# Patient Record
Sex: MALE | Race: WHITE | NOT HISPANIC OR LATINO | Employment: FULL TIME | URBAN - METROPOLITAN AREA
[De-identification: names, ages, dates, MRNs, and addresses within clinical notes are randomized per-mention and may not be internally consistent; named-entity substitution may affect disease eponyms.]

---

## 2018-08-21 ENCOUNTER — OFFICE VISIT (OUTPATIENT)
Dept: OBGYN CLINIC | Facility: MEDICAL CENTER | Age: 43
End: 2018-08-21
Payer: OTHER MISCELLANEOUS

## 2018-08-21 VITALS
HEART RATE: 78 BPM | SYSTOLIC BLOOD PRESSURE: 130 MMHG | BODY MASS INDEX: 35 KG/M2 | DIASTOLIC BLOOD PRESSURE: 89 MMHG | HEIGHT: 71 IN | WEIGHT: 250 LBS

## 2018-08-21 DIAGNOSIS — M77.11 LATERAL EPICONDYLITIS OF RIGHT ELBOW: Primary | ICD-10-CM

## 2018-08-21 PROCEDURE — 99204 OFFICE O/P NEW MOD 45 MIN: CPT | Performed by: FAMILY MEDICINE

## 2018-08-21 PROCEDURE — 20551 NJX 1 TENDON ORIGIN/INSJ: CPT | Performed by: FAMILY MEDICINE

## 2018-08-21 RX ORDER — FLUTICASONE PROPIONATE 50 MCG
SPRAY, SUSPENSION (ML) NASAL
COMMUNITY
Start: 2016-02-23

## 2018-08-21 RX ORDER — TOPIRAMATE 50 MG/1
50 TABLET, FILM COATED ORAL
COMMUNITY
Start: 2018-04-24

## 2018-08-21 RX ORDER — METHYLPREDNISOLONE ACETATE 40 MG/ML
1 INJECTION, SUSPENSION INTRA-ARTICULAR; INTRALESIONAL; INTRAMUSCULAR; SOFT TISSUE
Status: DISCONTINUED | OUTPATIENT
Start: 2018-08-21 | End: 2018-08-21

## 2018-08-21 RX ORDER — BUTALBITAL/ASPIRIN/CAFFEINE 50-325-40
1 CAPSULE ORAL EVERY 4 HOURS PRN
COMMUNITY

## 2018-08-21 RX ORDER — IBUPROFEN 600 MG/1
TABLET ORAL AS NEEDED
COMMUNITY
Start: 2018-07-30

## 2018-08-21 RX ORDER — TRAMADOL HYDROCHLORIDE 50 MG/1
50 TABLET ORAL EVERY 6 HOURS PRN
COMMUNITY
End: 2018-09-05 | Stop reason: ALTCHOICE

## 2018-08-21 RX ORDER — BUPIVACAINE HYDROCHLORIDE 2.5 MG/ML
0.5 INJECTION, SOLUTION INFILTRATION; PERINEURAL
Status: COMPLETED | OUTPATIENT
Start: 2018-08-21 | End: 2018-08-21

## 2018-08-21 RX ORDER — ESCITALOPRAM OXALATE 20 MG/1
20 TABLET ORAL
COMMUNITY
Start: 2018-04-24

## 2018-08-21 RX ADMIN — BUPIVACAINE HYDROCHLORIDE 0.5 ML: 2.5 INJECTION, SOLUTION INFILTRATION; PERINEURAL at 11:54

## 2018-08-21 NOTE — LETTER
August 21, 2018     Patient: Levine Render   YOB: 1975   Date of Visit: 8/21/2018       To Whom it May Concern:    Justine Gutierrez is under my professional care  He was seen in my office on 8/21/2018  He may return to work on 08/23/2018  I recommend light duty work restrictions to include no pulling pushing lifting over 10 lb right upper extremity  If you have any questions or concerns, please don't hesitate to call           Sincerely,          Brenda House III, DO        CC: Levine Render

## 2018-08-21 NOTE — PROGRESS NOTES
1  Lateral epicondylitis of right elbow  [facility]     Orders Placed This Encounter   Procedures    Hand/upper extremity injection    [facility]        Imaging Studies (I personally reviewed images in PACS and report):  X-ray report from urgent care reviewed:  No acute fracture  intact joint    IMPRESSION:  Right lateral tennis elbow  Gunderson    Interventions:  Corticosteroid injection lateral epicondyle right-08/21/2018  Physical therapy ordered right tennis elbow-08/21/2018  Light duty- 8/21/18    Return in about 2 weeks (around 9/4/2018)  For reassessment of return to full duty      Patient Instructions   Tennis elbow (lateral epicondylitis) or its cousin, Golfers elbow (medial epicondyltitis), are irritations and inflammations of the anchor points of your forearm muscles at your elbow  Tennis elbow is usually caused by overuse, extending your wrist (pushing wrist up) and golfers elbow by flexion of the wrist (pushing wrist down)  This takes at least 6 weeks to heal and usually up to 3 months to see complete resolution  Injections help to reduce pain significantly but the pain will return if you do not perform physical therapy  The curative treatment is physical therapy  Injections, tennis elbow straps, and anti-inflammatories only help to reduce pain  This is a clinical diagnosis and may require xray but MRI is usually not considered until at least 3 months of failed treatment  Surgery is generally reserved for cases which do not improve after 6 months of conservative treatment  (FRANCO Modi 2017)  reviewed red flags with patient regarding injection including infection, skin dimpling, hypo-pigmentation, tendon rupture, and nerve damage  patient expressed understanding and agreed to proceed with procedure  educated if any symptoms including fevers, chills, swelling, or worsening symptoms occur then to call office or go to hospital for immediate care if physician unavailable  patient expressed understanding and agreed to plan  CHIEF COMPLAINT:  Right elbow pain    HPI:  Marco Aquino is a 43 y o  male  who presents for       Visit  08/21/2018:   Evaluation right elbow pain has been ongoing for 3 weeks starting July 30, 2018  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 began soon after doing a whole with a shovel  Patient did present to Urgent Care for evaluation where had x-ray performed and he was told x-ray was normal   He was explained that he may have tennis elbow referred to Orthopedics for confirmation and evaluation  Today, patient points to lateral epicondyle as greatest source of pain  He states his worse with using his wrist for motion  He does have constant mild pain significantly worsened to moderate intensity sharp throbbing exacerbated by wrist extension  Review of Systems   Constitutional: Negative for chills, fever and unexpected weight change  HENT: Negative for hearing loss, nosebleeds and sore throat  Eyes: Negative for pain, redness and visual disturbance  Respiratory: Negative for cough, shortness of breath and wheezing  Cardiovascular: Negative for chest pain, palpitations and leg swelling  Gastrointestinal: Negative for abdominal distention, nausea and vomiting  Endocrine: Negative for polydipsia and polyuria  Genitourinary: Negative for dysuria and hematuria  Skin: Negative for rash and wound  Neurological: Negative for dizziness, numbness and headaches  Psychiatric/Behavioral: Negative for decreased concentration and suicidal ideas  Following history reviewed and update:    History reviewed  No pertinent past medical history  History reviewed  No pertinent surgical history    Social History   History   Alcohol Use No     History   Drug Use No     History   Smoking Status    Former Smoker    Types: Cigarettes   Smokeless Tobacco    Never Used     Comment: quit 2 yrs ago Family History   Problem Relation Age of Onset    Diabetes Mother     No Known Problems Father      No Known Allergies       Physical Exam  Constitutional:  see vital signs  Gen: well-developed, normocephalic/atraumatic, well-groomed  Eyes: No inflammation or discharge of conjunctiva or lids; sclera clear   Pharynx: no inflammation, lesion, or mass of lips  Neck: supple, no masses, non-distended  MSK: no inflammation, lesion, mass, or clubbing of nails and digits except for other than mentioned below  SKIN: no visible rashes or skin lesions  Pulmonary/Chest: Effort normal  No respiratory distress  NEURO: cranial nerves grossly intact  PSYCH:  Alert and oriented to person, place, and time; recent and remote memory intact; mood normal, no depression, anxiety, or agitation, judgment and insight good and intact     Right Elbow Exam     Tenderness   The patient is experiencing tenderness in the lateral epicondyle  Range of Motion   The patient has normal right elbow ROM  Muscle Strength   The patient has normal right elbow strength      Other   Erythema: absent  Scars: absent  Sensation: normal    Comments:  Chief complaint of pain reproduced at lateral epicondyle with resisted isometric contraction of wrist and finger extensors          Right Wrist  no swelling, erythema, or increased warmth  rom full  nontender  no laxity of joint; druj stable    Right Hand  no erythema  swelling:  None  tenderness:  None  rom fingers mcp, pip, dip intact without pain  No digital scissoring or deviation of fingers  no extensor lag  no rotation of fingers  no joint laxity  strenght flexion and extension mcp, pip, dip 5/5  sensation intact  capillary refill intact        Hand/upper extremity injection  Date/Time: 8/21/2018 11:54 AM  Consent given by: patient  Site marked: site marked  Timeout: Immediately prior to procedure a time out was called to verify the correct patient, procedure, equipment, support staff and site/side marked as required   Supporting Documentation  Indications: diagnostic, pain and therapeutic   Procedure Details  Condition:lateral epicondylitis Preparation: Patient was prepped and draped in the usual sterile fashion  Needle size: 22 G  Ultrasound guidance: no  Approach: medial  Medications administered: 0 5 mL bupivacaine 0 25 % (0 5ml  depo-medrol injected)  Patient tolerance: patient tolerated the procedure well with no immediate complications  Dressing:  Sterile dressing applied      Patient improvement of symptoms on repeat testing after injection

## 2018-08-21 NOTE — PATIENT INSTRUCTIONS
Tennis elbow (lateral epicondylitis) or its cousin, Golfers elbow (medial epicondyltitis), are irritations and inflammations of the anchor points of your forearm muscles at your elbow  Tennis elbow is usually caused by overuse, extending your wrist (pushing wrist up) and golfers elbow by flexion of the wrist (pushing wrist down)  This takes at least 6 weeks to heal and usually up to 3 months to see complete resolution  Injections help to reduce pain significantly but the pain will return if you do not perform physical therapy  The curative treatment is physical therapy  Injections, tennis elbow straps, and anti-inflammatories only help to reduce pain  This is a clinical diagnosis and may require xray but MRI is usually not considered until at least 3 months of failed treatment  Surgery is generally reserved for cases which do not improve after 6 months of conservative treatment  (FRANCO Modi 2017)

## 2018-08-27 ENCOUNTER — EVALUATION (OUTPATIENT)
Dept: PHYSICAL THERAPY | Facility: CLINIC | Age: 43
End: 2018-08-27
Payer: OTHER MISCELLANEOUS

## 2018-08-27 DIAGNOSIS — M77.11 EPICONDYLITIS, LATERAL, RIGHT: Primary | ICD-10-CM

## 2018-08-27 PROCEDURE — 97010 HOT OR COLD PACKS THERAPY: CPT

## 2018-08-27 PROCEDURE — 97162 PT EVAL MOD COMPLEX 30 MIN: CPT

## 2018-08-27 PROCEDURE — G8984 CARRY CURRENT STATUS: HCPCS

## 2018-08-27 PROCEDURE — G8985 CARRY GOAL STATUS: HCPCS

## 2018-08-27 PROCEDURE — 97110 THERAPEUTIC EXERCISES: CPT

## 2018-08-27 NOTE — PROGRESS NOTES
PT Evaluation     Today's date: 2018  Patient name: Isai Pyle  : 1975  MRN: [id number]  Referring provider: Chio Britt  Dx:   Encounter Diagnosis     ICD-10-CM    1  Epicondylitis, lateral, right M77 11                 Assessment  Impairments: abnormal or restricted ROM, impaired physical strength, lacks appropriate home exercise program and pain with function    Assessment details: Patient is a pleasant 43year old male who was referred to Out-Patient physical therapy s/p work related injury where he developed Lateral Epicondylitis of the Right Elbow on 2018  He underwent 0 5 mL bupivacaine 0 25 % (0 5ml  depo-medrol injected) on 2018  Patient reports some improvement in symptoms  At this time patient presents with Right elbow swelling, pain with palpation and  functional movements and decrease ROM and Muscle strength  Patient will benefit from skilled Pt intervention to address findings and facilitate optimal functional status  Understanding of Dx/Px/POC: excellent  Goals  STG:  Decrease pain at worse 4/10  Increased Right elbow supination/pronation AROM WNL  Increase MS 4/5  Decreased edema by 1 cm girth  Independent in HEP  LTG:  Resolve pain  Resolve edema  Improve MS 5/5  Improve AROM WNL  Return to full time/full duty work    Plan  Planned modality interventions: cryotherapy and unattended electrical stimulation  Planned therapy interventions: manual therapy, joint mobilization, patient education, strengthening, therapeutic exercise, therapeutic activities, stretching and home exercise program  Frequency: 3x week  Duration in weeks: 8  Treatment plan discussed with: patient        Subjective Evaluation    History of Present Illness  Date of onset: 2018  Mechanism of injury: Client reports was digging the stone out of a swell with a shovel and felt pain in the side of is elbow  He continued working for 5 hours     Not a recurrent problem   Quality of life: fair    Pain  Current pain rating: 3  At best pain rating: 3  At worst pain ratin  Location: Left lateral elbo region  Quality: dull ache and radiating  Relieving factors: ice and support  Aggravating factors: lifting and overhead activity  Progression: improved (s/p cortisone injections now symptoms seem to be improving)    Social Support  Steps to enter house: yes  Stairs in house: yes   Lives in: multiple-level home  Lives with: young children and spouse    Hand dominance: right    Treatments  Treatments tried: 18 cortisone injection  Patient Goals  Patient goals for therapy: return to work          Objective     Static Posture     Head  Forward  Shoulders  Rounded  Pelvis   Posterior pelvic tilt    Comments  Patient was made aware of the importance of proper sitting posture  Was able to perform without problems      Active Range of Motion   Left Shoulder   Normal active range of motion    Right Shoulder   Normal active range of motion    Left Elbow   Flexion: WFL  Extension: WFL  Forearm supination: WFL  Forearm pronation: WFL    Right Elbow   Flexion: WFL and with pain  Extension: WFL and with pain  Forearm supination: 70 degrees   Forearm pronation: 70 degrees     Strength/Myotome Testing     Left Shoulder   Normal muscle strength    Right Shoulder   Normal muscle strength    Left Elbow   Flexion: 5  Extension: 5  Forearm supination: 5  Forearm pronation: 5    Right Elbow   Flexion: 4+  Extension: 4-  Forearm supination: 3+  Forearm pronation: 3+    Left Wrist/Hand   Wrist extension: 5  Wrist flexion: 5  Radial deviation: 5     (2nd hand position)     Trial 1: 120    Right Wrist/Hand   Wrist extension: 4+  Wrist flexion: 4+  Radial deviation: 5  Ulnar deviation: 5     (2nd hand position)     Trial 1: 120    Swelling   Left Elbow Girth Measurements   Joint line: 30 cm  10 cm above joint line: 32 (5 cm above) cm  10 cm below joint line: 32 (5 cm bellow joint line) cm    Right Elbow Girth Measurements   Joint line: 32 cm  10 cm above joint line: 33 (5 cm above) cm  10 cm below joint line: 33 (5 cm below) cm      Flowsheet Rows      Most Recent Value   PT/OT G-Codes   Current Score  57   Projected Score  68   FOTO information reviewed  Yes   Assessment Type  Evaluation   G code set  Carrying, Moving & Handling Objects   Carrying, Moving and Handling Objects Current Status ()  CK   Carrying, Moving and Handling Objects Goal Status ()  CJ          Precautions:  none    Daily Treatment Diary     Manual                                                                                   Exercise Diary              Shoulder circles 10 x            Elbow supination 10x :10            Elbow pronation  10x :10            Wrist flex 10 x :10            wrist extesnion 10x :10                                                                                                                                                                                                                   Modalities  8/27             Cryotherapy 10'

## 2018-08-30 ENCOUNTER — OFFICE VISIT (OUTPATIENT)
Dept: PHYSICAL THERAPY | Facility: CLINIC | Age: 43
End: 2018-08-30
Payer: OTHER MISCELLANEOUS

## 2018-08-30 DIAGNOSIS — M77.11 EPICONDYLITIS, LATERAL, RIGHT: Primary | ICD-10-CM

## 2018-08-30 PROCEDURE — 97110 THERAPEUTIC EXERCISES: CPT

## 2018-08-30 PROCEDURE — 97140 MANUAL THERAPY 1/> REGIONS: CPT

## 2018-08-30 PROCEDURE — 97112 NEUROMUSCULAR REEDUCATION: CPT

## 2018-08-30 NOTE — PROGRESS NOTES
Daily Note     Today's date: 2018  Patient name: Kurt Cassidy  : 1975  MRN: [id number]  Referring provider: Bala Navarrete  Dx:   Encounter Diagnosis     ICD-10-CM    1  Epicondylitis, lateral, right M77 11                   Subjective: Patient states a zinging type of pain to the lateral epicondyle region today  Wearing his strap  Patient notes the strap has been worsening symptoms and giving more distal arm parasthesias  No acute resting pain on arrival   No real change after initial evaluation  No trouble with the exercises shown  Objective: See treatment diary below  Manual provided to decrease tissue thickness and ease pain associated with movement of the UE  Neuro Re-ed provided for improving tissue mobility for decreasing inflammation with KTape as well as antagonist activation to deactivate forearm extensors  TE for gentle stretching of the the lateral elbow with forearm extensors and supinator stretching  Palpable thickness detected to the common extensor tendon  Visible edema at lateral epicondyle  Assessment: Patient presents today with no acute pain on arrival but clear tenderness and thickness to the lateral elbow with signs of tendinopathy evident  Trialed manual techniques and taping today to ease acute pressure on the lateral epicondyle to decrease irritation was done  Displayed appropriate form with his exercises  EDUCATION:  Reviewed anatomy of the elbow and forearm to aid understanding of today's program additions  Trying without the strap for 3 days  Strap being worn snuggly but with attempt to loosen it, there is no tension on the mid-portion of common extensor tendon  Trial of KTape instead today to see if better relief gained with that  Plan: Continue with POC  Assess response to today's progression and adjust/modify as needed    Consider further forearm strengthening and initiation of eccentric lowering of forearm extensors for tissue remodeling     Precautions:  none    Daily Treatment Diary     Manual   8/30           CFM to common extensor tendon, STM to forearm extensors  x10'           KT - I strip with 50% tension for space correction over lateral epicondyle  x5mins (with education)                                                      Exercise Diary   8/30           Shoulder circles 10 x            Elbow supination 10x :10            Elbow pronation  10x :10            Wrist flex 10 x :10            wrist extesnion 10x :10            Forearm extensors stretch with pronation  5x30"           Resisted wrist flexion  2#, 2x15           Resisted pronation  2#, 2x15           Eccentric Lowering of Wrist Extension  15x5"                                                                                                                                                              Modalities  8/27             Cryotherapy 10'

## 2018-09-05 ENCOUNTER — OFFICE VISIT (OUTPATIENT)
Dept: OBGYN CLINIC | Facility: MEDICAL CENTER | Age: 43
End: 2018-09-05
Payer: OTHER MISCELLANEOUS

## 2018-09-05 VITALS
WEIGHT: 254 LBS | HEART RATE: 76 BPM | DIASTOLIC BLOOD PRESSURE: 80 MMHG | BODY MASS INDEX: 35.56 KG/M2 | HEIGHT: 71 IN | SYSTOLIC BLOOD PRESSURE: 118 MMHG

## 2018-09-05 DIAGNOSIS — M77.8 TENDINITIS OF RIGHT TRICEPS: ICD-10-CM

## 2018-09-05 DIAGNOSIS — M77.11 LATERAL EPICONDYLITIS OF RIGHT ELBOW: Primary | ICD-10-CM

## 2018-09-05 PROCEDURE — 99213 OFFICE O/P EST LOW 20 MIN: CPT | Performed by: FAMILY MEDICINE

## 2018-09-05 NOTE — PATIENT INSTRUCTIONS
I recommend the patient continue physical therapy  At this point time he is 85% improved overall however still continues to have some symptoms even at work  He does agree to return to work with light duty restrictions to include no pulling pushing or lifting over 10 lb  I will reassess patient in approximately 2 weeks for return to work status at that time

## 2018-09-05 NOTE — LETTER
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

## 2018-09-05 NOTE — PROGRESS NOTES
1  Lateral epicondylitis of right elbow     2  Tendinitis of right triceps       No orders of the defined types were placed in this encounter  Imaging Studies (I personally reviewed images in PACS and report):    Past diagnostics:  X-ray report from urgent care reviewed:  No acute fracture  intact joint    IMPRESSION:  Right lateral tennis elbow-85% improved  New onset triceps tendinitis ipsilateral right elbow  Cruz    Interventions:  Corticosteroid injection lateral epicondyle right-08/21/2018  Physical therapy ordered right tennis elbow-08/21/2018  Light duty- 8/21/18    Return in about 2 weeks (around 9/19/2018)  For reassessment of return to full duty      Patient Instructions     I recommend the patient continue physical therapy  At this point time he is 85% improved overall however still continues to have some symptoms even at work  He does agree to return to work with light duty restrictions to include no pulling pushing or lifting over 10 lb  I will reassess patient in approximately 2 weeks for return to work status at that time  CHIEF COMPLAINT:  Follow-up right tennis elbow and complains of new elbow pain same elbow    HPI:  Rebecca Cooper is a 43 y o  male  who presents for       Visit  08/21/2018:   Evaluation right elbow pain has been ongoing for 3 weeks starting July 30, 2018  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 began soon after doing a whole with a shovel  Patient did present to Urgent Care for evaluation where had x-ray performed and he was told x-ray was normal   He was explained that he may have tennis elbow referred to Orthopedics for confirmation and evaluation  Today, patient points to lateral epicondyle as greatest source of pain  He states his worse with using his wrist for motion  He does have constant mild pain significantly worsened to moderate intensity sharp throbbing exacerbated by wrist extension      Visit 09/05/2018: Follow-up right tennis elbow lateral epicondylitis:  85% improved  Patient states that after injection given last visit he feels significant improvement of lateral epicondyle pain when land michael pointed out to him  during examination  Patient also states that since his last visit he has developed intermittent achy pain and a different position is elbow and points to the distal triceps insertional point on olecranon  He states is not as painful as his lateral epicondyle and characterizes this new pain as intermittent mild to moderate intensity ache  He denies any new trauma  Today, patient states he feels that he can continue to perform his job with light duty work restrictions including no heavy lifting over 10 lb  Review of Systems   Constitutional: Negative for chills and fever  Neurological: Negative for weakness and numbness  Following history reviewed and update:    History reviewed  No pertinent past medical history  History reviewed  No pertinent surgical history  Social History   History   Alcohol Use No     History   Drug Use No     History   Smoking Status    Former Smoker    Types: Cigarettes   Smokeless Tobacco    Never Used     Comment: quit 2 yrs ago     Family History   Problem Relation Age of Onset    Diabetes Mother     No Known Problems Father      No Known Allergies       Physical Exam  Constitutional:  see vital signs  Gen: well-developed, normocephalic/atraumatic, well-groomed  Eyes: No inflammation or discharge of conjunctiva or lids; sclera clear   Pharynx: no inflammation, lesion, or mass of lips  Neck: supple, no masses, non-distended  MSK: no inflammation, lesion, mass, or clubbing of nails and digits except for other than mentioned below  SKIN: no visible rashes or skin lesions  Pulmonary/Chest: Effort normal  No respiratory distress     NEURO: cranial nerves grossly intact  PSYCH:  Alert and oriented to person, place, and time; recent and remote memory intact; mood normal, no depression, anxiety, or agitation, judgment and insight good and intact      Right Elbow Exam     Tenderness   The patient is experiencing tenderness in the lateral epicondyle  Range of Motion   The patient has normal right elbow ROM  Muscle Strength   The patient has normal right elbow strength  Other   Erythema: absent  Scars: absent  Sensation: normal    Comments:  Chief complaint of pain reproduced at lateral epicondyle with resisted isometric contraction of wrist and finger extensors that is significantly improved compared to his previous visit  Patient also has point tenderness at triceps insertion mild  Triceps strength 5/5  No crepitus, swelling, erythema at site of this new tenderness              Procedures

## 2018-09-06 ENCOUNTER — APPOINTMENT (OUTPATIENT)
Dept: PHYSICAL THERAPY | Facility: CLINIC | Age: 43
End: 2018-09-06
Payer: OTHER MISCELLANEOUS

## 2018-09-10 ENCOUNTER — OFFICE VISIT (OUTPATIENT)
Dept: PHYSICAL THERAPY | Facility: CLINIC | Age: 43
End: 2018-09-10
Payer: OTHER MISCELLANEOUS

## 2018-09-10 DIAGNOSIS — M77.11 EPICONDYLITIS, LATERAL, RIGHT: Primary | ICD-10-CM

## 2018-09-10 PROCEDURE — 97010 HOT OR COLD PACKS THERAPY: CPT

## 2018-09-10 PROCEDURE — 97140 MANUAL THERAPY 1/> REGIONS: CPT

## 2018-09-10 PROCEDURE — 97014 ELECTRIC STIMULATION THERAPY: CPT

## 2018-09-10 PROCEDURE — 97110 THERAPEUTIC EXERCISES: CPT

## 2018-09-10 NOTE — PROGRESS NOTES
Daily Note     Today's date: 9/10/2018  Patient name: Rebecca Cooper  : 1975  MRN: [id number]  Referring provider: Thang Santiago  Dx:   Encounter Diagnosis     ICD-10-CM    1  Epicondylitis, lateral, right M77 11                   Subjective: Patient reported mild improvement in symptoms, minimal change with work duties  Denied significant soreness following previous treatment  Has not worn strap since prior to last treatment as it was causing irritation  Use of CP at home to alleviate symptoms  Objective: See treatment diary below      Assessment: Added IASTM to address presence of R lateral elbow and forearm restrictions with good tolerance  H-wave used as symptom relief post treatment  Plan: Continue with POC  Assess response to today's progression and adjust/modify as needed  Consider further forearm strengthening and initiation of eccentric lowering of forearm extensors for tissue remodeling  Returns to [facility] week around  for f/u        Precautions:  none    Daily Treatment Diary   Manual   8/30 9/10          CFM to common extensor tendon, IASTM / STM to forearm extensors  x10' 15 min          KT - I strip with 50% tension for space correction over lateral epicondyle  x5mins (with education) NP                                                     Exercise Diary   8/30 9/10          Shoulder circles 10 x            Elbow supination 10x :10            Elbow pronation  10x :10            Wrist flex 10 x :10            Wrist ext 10x :10            Forearm extensors stretch with pronation  5x30" 30"x3          Resisted wrist flexion  2#, 2x15 2#  2x15          Resisted pronation  2#, 2x15 2#  2x15          Eccentric lowering of wrist extension  15x5" 5"x15                                                                                                                                                             Modalities  8/27  8/30 9/10          Cryotherapy 10'            H-wave (high-low setting) and CP post   10 min post

## 2018-09-13 ENCOUNTER — OFFICE VISIT (OUTPATIENT)
Dept: PHYSICAL THERAPY | Facility: CLINIC | Age: 43
End: 2018-09-13
Payer: OTHER MISCELLANEOUS

## 2018-09-13 DIAGNOSIS — M77.11 EPICONDYLITIS, LATERAL, RIGHT: Primary | ICD-10-CM

## 2018-09-13 PROCEDURE — 97140 MANUAL THERAPY 1/> REGIONS: CPT

## 2018-09-13 PROCEDURE — 97014 ELECTRIC STIMULATION THERAPY: CPT

## 2018-09-13 PROCEDURE — 97110 THERAPEUTIC EXERCISES: CPT

## 2018-09-13 NOTE — PROGRESS NOTES
Daily Note     Today's date: 2018  Patient name: Joanna Waters  : 1975  MRN: [id number]  Referring provider: Bessie Black  Dx:   Encounter Diagnosis     ICD-10-CM    1  Epicondylitis, lateral, right M77 11                   Subjective: Patient reports some improvement after last visit  Pt c/o cont'd constant discomfort right proximal forearm;  increase pain with elbow flexion and lifting activity  Objective: See treatment diary below      Assessment:   Moderate TTP/soft tissue restriction noted right extensor muscle mass and distal bicep tendon  Pain end range elbow flexion and with resisted elbow sup/pronation  Decrease TE as noted secondary to pain  Good response to manual therapy and H-wave  Plan: Continue with POC  Returns to MD week around  for f/u        Precautions:  none    Daily Treatment Diary   Manual   8/30 9/10 9/13         CFM to common extensor tendon, IASTM / STM to forearm extensors  x10' 15 min 15 min         KT - I strip with 50% tension for space correction over lateral epicondyle  x5mins (with education) NP                                                     Exercise Diary   8/30 9/10 9/13         Shoulder circles 10 x            Elbow supination 10x :10            Elbow pronation  10x :10            Wrist flex 10 x :10            Wrist ext 10x :10            Forearm extensors stretch with pronation  5x30" 30"x3 30"x3          Resisted wrist flexion  2#, 2x15 2#  2x15 2# x15         Resisted pronation  2#, 2x15 2#  2x15 2# x10         Eccentric lowering of wrist extension  15x5" 5"x15 np                                                                                                                                                            Modalities  8/27  8/30 9/10 9/13         Cryotherapy 10'            H-wave (high-low setting) and CP post   10 min post 15 min

## 2018-09-17 ENCOUNTER — APPOINTMENT (OUTPATIENT)
Dept: PHYSICAL THERAPY | Facility: CLINIC | Age: 43
End: 2018-09-17
Payer: OTHER MISCELLANEOUS

## 2018-09-20 ENCOUNTER — OFFICE VISIT (OUTPATIENT)
Dept: PHYSICAL THERAPY | Facility: CLINIC | Age: 43
End: 2018-09-20
Payer: OTHER MISCELLANEOUS

## 2018-09-20 DIAGNOSIS — M77.11 EPICONDYLITIS, LATERAL, RIGHT: Primary | ICD-10-CM

## 2018-09-20 PROCEDURE — 97110 THERAPEUTIC EXERCISES: CPT

## 2018-09-20 PROCEDURE — 97140 MANUAL THERAPY 1/> REGIONS: CPT

## 2018-09-20 PROCEDURE — 97014 ELECTRIC STIMULATION THERAPY: CPT

## 2018-09-20 NOTE — PROGRESS NOTES
Daily Note     Today's date: 2018  Patient name: Jr Bynum  : 1975  MRN: [id number]  Referring provider: Raymond Skinner  Dx:   Encounter Diagnosis     ICD-10-CM    1  Epicondylitis, lateral, right M77 11                   Subjective: Patient reports approx 80-85% improvement since starting therapy  Pt reports soreness after last visit but feeling better the past 4-5 days  Pt reports occasional "stinging/burning" sensation posterior aspect right elbow; improved tolerance to lifting activity  Objective: See treatment diary below      Assessment:   Decreased TTP/soft tissue restrictions noted right extensor muscle mass and distal bicep tendon  Improved painfree elbow flexion and forearm supination/pronation ROM; minimal pain end range forearm pronation  Progressed TE as noted with good tolerance  Pt cont's to respond well to manual therapy and H-wave  Plan: Continue with POC  Pt has MD appt           Precautions:  none    Daily Treatment Diary   Manual   8/30 9/10 9/13 9/20        CFM to common extensor tendon, IASTM / STM to forearm extensors  x10' 15 min 15 min 15 min        KT - I strip with 50% tension for space correction over lateral epicondyle  x5mins (with education) NP                                                     Exercise Diary   8/30 9/10 9/13 9/20        Shoulder circles 10 x    -----        Elbow supination 10x :10    -----        Elbow pronation  10x :10    -----        Wrist flex 10 x :10    -----        Wrist ext 10x :10    -----        Forearm extensors stretch with pronation  5x30" 30"x3 30"x3  30"x3        Resisted wrist flexion  2#, 2x15 2#  2x15 2# x15 2# x10        Resisted pronation/supination  2#, 2x15 2#  2x15 2# x10 2# x10 ea        Eccentric lowering of wrist extension  15x5" 5"x15 np 2# x10 Modalities  8/27  8/30 9/10 9/13 9/20        Cryotherapy 10'            H-wave (high-low setting) and CP post   10 min post 15 min  15 min

## 2018-09-24 ENCOUNTER — OFFICE VISIT (OUTPATIENT)
Dept: PHYSICAL THERAPY | Facility: CLINIC | Age: 43
End: 2018-09-24
Payer: OTHER MISCELLANEOUS

## 2018-09-24 ENCOUNTER — OFFICE VISIT (OUTPATIENT)
Dept: OBGYN CLINIC | Facility: MEDICAL CENTER | Age: 43
End: 2018-09-24
Payer: OTHER MISCELLANEOUS

## 2018-09-24 VITALS
HEART RATE: 85 BPM | SYSTOLIC BLOOD PRESSURE: 122 MMHG | DIASTOLIC BLOOD PRESSURE: 76 MMHG | HEIGHT: 71 IN | BODY MASS INDEX: 35.7 KG/M2 | WEIGHT: 255 LBS

## 2018-09-24 DIAGNOSIS — M77.11 LATERAL EPICONDYLITIS OF RIGHT ELBOW: Primary | ICD-10-CM

## 2018-09-24 DIAGNOSIS — M77.11 EPICONDYLITIS, LATERAL, RIGHT: Primary | ICD-10-CM

## 2018-09-24 PROCEDURE — 97140 MANUAL THERAPY 1/> REGIONS: CPT

## 2018-09-24 PROCEDURE — 99213 OFFICE O/P EST LOW 20 MIN: CPT | Performed by: FAMILY MEDICINE

## 2018-09-24 PROCEDURE — 97110 THERAPEUTIC EXERCISES: CPT

## 2018-09-24 NOTE — LETTER
September 24, 2018     Patient: Jr Bynum   YOB: 1975   Date of Visit: 9/24/2018       To Whom it May Concern:    Tiffani Morales is under my professional care  He was seen in my office on 9/24/2018  He may return to work on 09/25/2018  progress to return to work full duty no restrictions  Patient continue formal physical therapy  If you have any questions or concerns, please don't hesitate to call           Sincerely,          Jorge Saucedo III, DO        CC: Jr Bynum

## 2018-09-24 NOTE — PROGRESS NOTES
Daily Note     Today's date: 2018  Patient name: Rm Denis  : 1975  MRN: [id number]  Referring provider: Trace Oneil  Dx:   Encounter Diagnosis     ICD-10-CM    1  Epicondylitis, lateral, right M77 11               Subjective: Pt reports no pain since LV but notes continued episodes of "random zinging and burning" in R lateral epicondyle of unknown cause  Objective: See treatment diary below    Precautions:  none    Daily Treatment Diary   Manual   8/30 9/10 9/13 9/20 9/24       CFM to common extensor tendon, IASTM / STM to forearm extensors  x10' 15 min 15 min 15 min SH       KT - I strip with 50% tension for space correction over lateral epicondyle  x5mins (with education) NP   [facility]                      Exercise Diary   8/30 9/10 9/13 9/20 9/24       Shoulder circles 10 x    ----- ---       Elbow supination 10x :10    ----- ---       Elbow pronation  10x :10    ----- ---       Wrist flex 10 x :10    ----- ---       Wrist ext 10x :10    ----- ---       Forearm extensors stretch with pronation  5x30" 30"x3 30"x3  30"x3 30"x3       Resisted wrist flexion  2#, 2x15 2#  2x15 2# x15 2# x10 2# 15x       Resisted pronation/supination  2#, 2x15 2#  2x15 2# x10 2# x10 ea 2# 15x       Eccentric lowering of wrist extension  15x5" 5"x15 np 2# x10 2# 15x                    UBE      2' ea                      Modalities  8/27  8/30 9/10 9/13 9/20 9/24       Cryotherapy 10'            H-wave (high-low setting) and CP post   10 min post 15 min  15 min declined                      UBE -> IASTM & STM -> therex -> [employer]    Assessment: Tolerated treatment well  Patient exhibited good technique with therapeutic exercises and would benefit from continued PT to address continued symptoms  Plan: Continue per plan of care  Progress treatment as tolerated        Simona Magallanes, PTA

## 2018-09-24 NOTE — PROGRESS NOTES
1  Lateral epicondylitis of right elbow       No orders of the defined types were placed in this encounter  Imaging Studies (I personally reviewed images in PACS and report):    Past diagnostics:  X-ray report from urgent care reviewed:  No acute fracture  intact joint    IMPRESSION:  Right lateral tennis elbow-significant improved-no pain  Cruz    Interventions:  Corticosteroid injection lateral epicondyle right-08/21/2018  Physical therapy ordered right tennis elbow-08/21/2018  Light duty- 8/21/18    Return in about 6 weeks (around 11/5/2018)  Patient Instructions    Patient has full strength without pain today on his examination  As such we progress to return to work full duty no restrictions  Patient continue formal physical therapy  Tennis elbow (lateral epicondylitis) or its cousin, Golfers elbow (medial epicondyltitis), are irritations and inflammations of the anchor points of your forearm muscles at your elbow  Tennis elbow is usually caused by overuse, extending your wrist (pushing wrist up) and golfers elbow by flexion of the wrist (pushing wrist down)  This takes at least 6 weeks to heal and usually up to 3 months to see complete resolution  Injections help to reduce pain significantly but the pain will return if you do not perform physical therapy  The curative treatment is physical therapy  Injections, tennis elbow straps, and anti-inflammatories only help to reduce pain  This is a clinical diagnosis and may require xray but MRI is usually not considered until at least 3 months of failed treatment  Surgery is generally reserved for cases which do not improve after 6 months of conservative treatment  (FRANCO Modi 2017)  CHIEF COMPLAINT:  Follow-up right tennis elbow     HPI:  Millie Alexandra is a 43 y o  male  who presents for       Visit  08/21/2018:   Evaluation right elbow pain has been ongoing for 3 weeks starting July 30, 2018    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 but states that his pain is right elbow began soon after doing a whole with a shovel  Patient did present to Urgent Care for evaluation where had x-ray performed and he was told x-ray was normal   He was explained that he may have tennis elbow referred to Orthopedics for confirmation and evaluation  Today, patient points to lateral epicondyle as greatest source of pain  He states his worse with using his wrist for motion  He does have constant mild pain significantly worsened to moderate intensity sharp throbbing exacerbated by wrist extension  Visit 09/05/2018: Follow-up right tennis elbow lateral epicondylitis:  85% improved  Patient states that after injection given last visit he feels significant improvement of lateral epicondyle pain when land michael pointed out to him  during examination  Patient also states that since his last visit he has developed intermittent achy pain and a different position is elbow and points to the distal triceps insertional point on olecranon  He states is not as painful as his lateral epicondyle and characterizes this new pain as intermittent mild to moderate intensity ache  He denies any new trauma  Today, patient states he feels that he can continue to perform his job with light duty work restrictions including no heavy lifting over 10 lb  Follow-up Visit 09/24/2018: Follow-up right tennis elbow lateral epicondylitis:  Significantly improved  Patient states even more improved since his last visit  He states he does have residual intermittent numbness and tingling lateral elbow epicondyle region as well as soreness in his proximal forearm lateral side  He has been compliant physical therapy and states this is helping especially with muscle massage  Last visit he was continue with light duty work restrictions to include no more lifting than 10 lb  Today,      Review of Systems   Constitutional: Negative for chills and fever     Neurological: Negative for weakness  Following history reviewed and update:    History reviewed  No pertinent past medical history  History reviewed  No pertinent surgical history  Social History   History   Alcohol Use No     History   Drug Use No     History   Smoking Status    Former Smoker    Types: Cigarettes   Smokeless Tobacco    Never Used     Comment: quit 2 yrs ago     Family History   Problem Relation Age of Onset    Diabetes Mother     No Known Problems Father      No Known Allergies       Physical Exam  Constitutional:  see vital signs  Gen: well-developed, normocephalic/atraumatic, well-groomed  Eyes: No inflammation or discharge of conjunctiva or lids; sclera clear   Pharynx: no inflammation, lesion, or mass of lips  Neck: supple, no masses, non-distended  MSK: no inflammation, lesion, mass, or clubbing of nails and digits except for other than mentioned below  SKIN: no visible rashes or skin lesions  Pulmonary/Chest: Effort normal  No respiratory distress  NEURO: cranial nerves grossly intact  PSYCH:  Alert and oriented to person, place, and time; recent and remote memory intact; mood normal, no depression, anxiety, or agitation, judgment and insight good and intact       Right Elbow Exam     Tenderness   The patient is experiencing no tenderness  Range of Motion   The patient has normal right elbow ROM  Muscle Strength   The patient has normal right elbow strength  Other   Erythema: absent  Scars: absent  Sensation: normal    Comments:  No pain reproduced at lateral epicondyle with resisted isometric contraction of wrist and finger extensors            Procedures      Total visit time was 15 minutes of which more than 50% was face to face counseling and/or coordination of care with patient regarding their treatment plan as outlined in note

## 2018-09-24 NOTE — PATIENT INSTRUCTIONS
Patient has full strength without pain today on his examination  As such we progress to return to work full duty no restrictions  Patient continue formal physical therapy  Tennis elbow (lateral epicondylitis) or its cousin, Golfers elbow (medial epicondyltitis), are irritations and inflammations of the anchor points of your forearm muscles at your elbow  Tennis elbow is usually caused by overuse, extending your wrist (pushing wrist up) and golfers elbow by flexion of the wrist (pushing wrist down)  This takes at least 6 weeks to heal and usually up to 3 months to see complete resolution  Injections help to reduce pain significantly but the pain will return if you do not perform physical therapy  The curative treatment is physical therapy  Injections, tennis elbow straps, and anti-inflammatories only help to reduce pain  This is a clinical diagnosis and may require xray but MRI is usually not considered until at least 3 months of failed treatment  Surgery is generally reserved for cases which do not improve after 6 months of conservative treatment  (FRANCO Modi 2017)

## 2018-09-27 ENCOUNTER — OFFICE VISIT (OUTPATIENT)
Dept: PHYSICAL THERAPY | Facility: CLINIC | Age: 43
End: 2018-09-27
Payer: OTHER MISCELLANEOUS

## 2018-09-27 DIAGNOSIS — M77.11 EPICONDYLITIS, LATERAL, RIGHT: Primary | ICD-10-CM

## 2018-09-27 PROCEDURE — 97140 MANUAL THERAPY 1/> REGIONS: CPT

## 2018-09-27 PROCEDURE — 97110 THERAPEUTIC EXERCISES: CPT

## 2018-09-27 NOTE — PROGRESS NOTES
Daily Note     Today's date: 2018  Patient name: Martha Stephens  : 1975  MRN: [id number]  Referring provider: Genna Carpio  Dx:   Encounter Diagnosis     ICD-10-CM    1  Epicondylitis, lateral, right M77 11               Subjective: Pt reports significant reduction in pain over past few weeks and has less frequent episodes of numbness and tingling in lateral epicondyle  Tender to deep palpation with deep tissue friction massage  Objective: See treatment diary below    Precautions:  none    Daily Treatment Diary   Manual   8/30 9/10 9/13 9/20 9/24 9/27      CFM to common extensor tendon, IASTM / STM to forearm extensors  x10' 15 min 15 min 15 min SH SH      KT - I strip with 50% tension for space correction over lateral epicondyle  x5mins (with education) NP   [facility] ---                     Exercise Diary   8/30 9/10 9/13 9/20 9/24 9/27      Shoulder circles 10 x    ----- --- ---      Elbow supination 10x :10    ----- --- ---      Elbow pronation  10x :10    ----- --- ---      Wrist flex 10 x :10    ----- --- ---      Wrist ext 10x :10    ----- --- ---      Forearm extensors stretch with pronation  5x30" 30"x3 30"x3  30"x3 30"x3 30"x3      Resisted wrist flexion  2#, 2x15 2#  2x15 2# x15 2# x10 2# 15x 3# 20x      Resisted pronation/supination  2#, 2x15 2#  2x15 2# x10 2# x10 ea 2# 15x 3# 20x      Eccentric lowering of wrist extension  15x5" 5"x15 np 2# x10 2# 15x 3# 20x                   UBE      2' ea 3' ea                     Modalities  8/27  8/30 9/10 9/13 9/20 9/24       Cryotherapy 10'            H-wave (high-low setting) and CP post   10 min post 15 min  15 min declined         Assessment: Tolerated treatment well  Patient demonstrated fatigue post treatment, exhibited good technique with therapeutic exercises and would benefit from continued PT to further improve strength and reduce painful episodes  Plan: Continue per plan of care  Progress treatment as tolerated    Decreased therapy frequency to 1x/week      Nicole Morris, PTA

## 2018-10-01 ENCOUNTER — APPOINTMENT (OUTPATIENT)
Dept: PHYSICAL THERAPY | Facility: CLINIC | Age: 43
End: 2018-10-01
Payer: OTHER MISCELLANEOUS

## 2018-10-04 ENCOUNTER — OFFICE VISIT (OUTPATIENT)
Dept: PHYSICAL THERAPY | Facility: CLINIC | Age: 43
End: 2018-10-04
Payer: OTHER MISCELLANEOUS

## 2018-10-04 DIAGNOSIS — M77.11 EPICONDYLITIS, LATERAL, RIGHT: Primary | ICD-10-CM

## 2018-10-04 PROCEDURE — 97140 MANUAL THERAPY 1/> REGIONS: CPT

## 2018-10-04 PROCEDURE — 97110 THERAPEUTIC EXERCISES: CPT

## 2018-10-04 NOTE — PROGRESS NOTES
Daily Note     Today's date: 10/4/2018  Patient name: Leslee Pabon  : 1975  MRN: [id number]  Referring provider: Michele Jerry  Dx:   Encounter Diagnosis     ICD-10-CM    1  Epicondylitis, lateral, right M77 11               Subjective: Pt reports 95% improvement since starting therapy  Pt c/o only occasional "stinging" discomfort posterior aspect right elbow  Pt reports no difficulty with work activity  Objective: See treatment diary below  Pt seen x8 visits with FOTO outcome measure 57 at IE and 83 this visit  Precautions:  none    Daily Treatment Diary   Manual   8/30 9/10 9/13 9/20 9/24 9/27 10/4     CFM to common extensor tendon, IASTM / STM to forearm extensors  x10' 15 min 15 min 15 min SH SH 10 min     KT - I strip with 50% tension for space correction over lateral epicondyle  x5mins (with education) NP   SH --- np                    Exercise Diary   8/30 9/10 9/13 9/20 9/24 9/27 10     Shoulder circles 10 x    ----- --- --- ---     Elbow supination 10x :10    ----- --- --- ---     Elbow pronation  10x :10    ----- --- --- ---     Wrist flex 10 x :10    ----- --- --- ---     Wrist ext 10x :10    ----- --- --- ---     Forearm extensors stretch with pronation  5x30" 30"x3 30"x3  30"x3 30"x3 30"x3 30"x3     Resisted wrist flexion  2#, 2x15 2#  2x15 2# x15 2# x10 2# 15x 3# 20x 3# x20     Resisted pronation/supination  2#, 2x15 2#  2x15 2# x10 2# x10 ea 2# 15x 3# 20x 3#x20     Eccentric lowering of wrist extension  15x5" 5"x15 np 2# x10 2# 15x 3# 20x 3#x20                  UBE      2' ea 3' ea np                    Modalities  8/27  8/30 9/10 9/13 9/20 9/24  10/4      Cryotherapy 10'            H-wave (high-low setting) and CP post   10 min post 15 min  15 min declined  np       Assessment:  Good tolerance to TE and manual therapy; no pain or TTP noted  Full painfree AROM right elbow and forearm  Pt independent with HEP        Plan:  Discussed with therapist (ND) and will hold therapy at this time  Pt has MD appt 11/12/18        Joann Villalpando, MARY

## 2018-10-11 ENCOUNTER — APPOINTMENT (OUTPATIENT)
Dept: PHYSICAL THERAPY | Facility: CLINIC | Age: 43
End: 2018-10-11
Payer: OTHER MISCELLANEOUS

## 2018-10-18 ENCOUNTER — APPOINTMENT (OUTPATIENT)
Dept: PHYSICAL THERAPY | Facility: CLINIC | Age: 43
End: 2018-10-18
Payer: OTHER MISCELLANEOUS

## 2018-10-25 ENCOUNTER — APPOINTMENT (OUTPATIENT)
Dept: PHYSICAL THERAPY | Facility: CLINIC | Age: 43
End: 2018-10-25
Payer: OTHER MISCELLANEOUS

## 2018-11-17 NOTE — PROGRESS NOTES
1  Lateral epicondylitis of right elbow       No orders of the defined types were placed in this encounter  Imaging Studies (I personally reviewed images in PACS and report):    Past diagnostics:  X-ray report from urgent care reviewed:  No acute fracture  intact joint    IMPRESSION:  Right lateral tennis elbow-significant improved-no pain  Cruz    Interventions:  Corticosteroid injection lateral epicondyle right-08/21/2018  Physical therapy ordered right tennis elbow-08/21/2018  Light duty- 8/21/18  Return to full duty- 9/24/18    Return for follow-up for finger fracture  Patient Instructions   Patient has 100% resolution of tennis elbow on today's examination right arm  He may return to work full duty without restrictions  It was noted today that he also had a non-work injury to right middle finger  I explained this is likely a tuft fracture to his distal phalanx and should return for visit to have xray performed and also splinting  I did explain to patient that sometimes this can result in nail deformity  I also explained that if he has any redness, worsening pain, or swelling then to go to ER immediately  CHIEF COMPLAINT:  Follow-up right tennis elbow     HPI:  Viviana Hernandez is a 37 y o  male  who presents for       Visit  08/21/2018:   Evaluation right elbow pain has been ongoing for 3 weeks starting July 30, 2018  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 began soon after doing a whole with a shovel  Patient did present to Urgent Care for evaluation where had x-ray performed and he was told x-ray was normal   He was explained that he may have tennis elbow referred to Orthopedics for confirmation and evaluation  Today, patient points to lateral epicondyle as greatest source of pain  He states his worse with using his wrist for motion   He does have constant mild pain significantly worsened to moderate intensity sharp throbbing exacerbated by wrist extension  Visit 09/05/2018: Follow-up right tennis elbow lateral epicondylitis:  85% improved  Patient states that after injection given last visit he feels significant improvement of lateral epicondyle pain when land michael pointed out to him  during examination  Patient also states that since his last visit he has developed intermittent achy pain and a different position is elbow and points to the distal triceps insertional point on olecranon  He states is not as painful as his lateral epicondyle and characterizes this new pain as intermittent mild to moderate intensity ache  He denies any new trauma  Today, patient states he feels that he can continue to perform his job with light duty work restrictions including no heavy lifting over 10 lb  Follow-up Visit 09/24/2018: Follow-up right tennis elbow lateral epicondylitis:  Significantly improved  Patient states even more improved since his last visit  He states he does have residual intermittent numbness and tingling lateral elbow epicondyle region as well as soreness in his proximal forearm lateral side  He has been compliant physical therapy and states this is helping especially with muscle massage  Last visit he was continue with light duty work restrictions to include no more lifting than 10 lb  Today, patient feels 100% improved  He was advanced to full duty last visit but his employer has not since returned him to full duty  However, he tells me that he has no pain with sweeping, mopping, cleaning at work as well as with ADL and chores at home  Review of Systems   Constitutional: Negative for chills and fever  Neurological: Negative for weakness and numbness  Following history reviewed and update:    History reviewed  No pertinent past medical history  History reviewed  No pertinent surgical history    Social History   History   Alcohol Use No     History   Drug Use No     History   Smoking Status    Former Smoker    Types: Cigarettes   Smokeless Tobacco    Never Used     Comment: quit 2 yrs ago     Family History   Problem Relation Age of Onset    Diabetes Mother     No Known Problems Father      No Known Allergies       Physical Exam  Constitutional:  see vital signs  Gen: well-developed, normocephalic/atraumatic, well-groomed  Eyes: No inflammation or discharge of conjunctiva or lids; sclera clear   Pharynx: no inflammation, lesion, or mass of lips  Neck: supple, no masses, non-distended  MSK: no inflammation, lesion, mass, or clubbing of nails and digits except for other than mentioned below  SKIN: no visible rashes or skin lesions  Pulmonary/Chest: Effort normal  No respiratory distress  NEURO: cranial nerves grossly intact  PSYCH:  Alert and oriented to person, place, and time; recent and remote memory intact; mood normal, no depression, anxiety, or agitation, judgment and insight good and intact      Right Elbow Exam     Tenderness   The patient is experiencing no tenderness  Range of Motion   The patient has normal right elbow ROM  Muscle Strength   The patient has normal right elbow strength  Other   Erythema: absent  Scars: absent  Sensation: normal    Comments: There is no pain reproduced at lateral epicondyle with resisted isometric contraction of wrist and finger extensors and no pain at medial epicondyle with resisted isometric contraction of wrist and finger flexor tendons          Right Hand  no erythema  Right middle finger distal phalanx there is ecchymosis to ring proximal 50% of nail bed  There is 3 drill holes visible in nail bed  There is no redness erythema or increased warmth  There is no tenderness    swelling:  None  tenderness:  None  rom fingers mcp, pip, dip intact without pain  No digital scissoring or deviation of fingers  no extensor lag  no rotation of fingers  no joint laxity  strenght flexion and extension mcp, pip, dip 5/5  sensation intact  capillary refill intact      Procedures

## 2018-11-19 ENCOUNTER — OFFICE VISIT (OUTPATIENT)
Dept: OBGYN CLINIC | Facility: MEDICAL CENTER | Age: 43
End: 2018-11-19
Payer: OTHER MISCELLANEOUS

## 2018-11-19 VITALS
WEIGHT: 250 LBS | BODY MASS INDEX: 35 KG/M2 | SYSTOLIC BLOOD PRESSURE: 133 MMHG | HEIGHT: 71 IN | HEART RATE: 106 BPM | DIASTOLIC BLOOD PRESSURE: 89 MMHG

## 2018-11-19 DIAGNOSIS — M77.11 LATERAL EPICONDYLITIS OF RIGHT ELBOW: Primary | ICD-10-CM

## 2018-11-19 PROCEDURE — 99212 OFFICE O/P EST SF 10 MIN: CPT | Performed by: FAMILY MEDICINE

## 2018-11-19 RX ORDER — METHOCARBAMOL 500 MG/1
500 TABLET, FILM COATED ORAL 3 TIMES DAILY PRN
COMMUNITY
End: 2018-11-19

## 2018-11-19 NOTE — LETTER
November 19, 2018     Patient: Jessica Yao   YOB: 1975   Date of Visit: 11/19/2018       To Whom it May Concern:    Jessika Novak is under my professional care  He was seen in my office on 11/19/2018  Patient has 100% resolution of tennis elbow on today's examination right arm  He may return to work full duty without restrictions  If you have any questions or concerns, please don't hesitate to call           Sincerely,          Brendan Fuentes III, DO        CC: Jessica Yao

## 2018-11-19 NOTE — PATIENT INSTRUCTIONS
Patient has 100% resolution of tennis elbow on today's examination right arm  He may return to work full duty without restrictions  It was noted today that he also had a non-work injury to right middle finger  I explained this is likely a tuft fracture to his distal phalanx and should return for visit to have xray performed and also splinting  I did explain to patient that sometimes this can result in nail deformity  I also explained that if he has any redness, worsening pain, or swelling then to go to ER immediately